# Patient Record
Sex: FEMALE | URBAN - METROPOLITAN AREA
[De-identification: names, ages, dates, MRNs, and addresses within clinical notes are randomized per-mention and may not be internally consistent; named-entity substitution may affect disease eponyms.]

---

## 2020-11-13 ENCOUNTER — TELEPHONE (OUTPATIENT)
Dept: TRANSPLANT | Facility: CLINIC | Age: 45
End: 2020-11-13

## 2020-11-13 NOTE — TELEPHONE ENCOUNTER
Karen Long called and stated that she is interested in becoming a living donor for her friend, Raphael Tinsley.  Medical and social history obtained. Patient reports having at least 5 episodes of kidney stones and has required lithotripsy and cysto in the past. Informed that she does not meet criteria for kidney donation. All questions answered.  Patient verbalized understanding.